# Patient Record
Sex: MALE | Race: WHITE | NOT HISPANIC OR LATINO | Employment: FULL TIME | URBAN - METROPOLITAN AREA
[De-identification: names, ages, dates, MRNs, and addresses within clinical notes are randomized per-mention and may not be internally consistent; named-entity substitution may affect disease eponyms.]

---

## 2018-04-20 ENCOUNTER — TRANSCRIBE ORDERS (OUTPATIENT)
Dept: NEPHROLOGY | Facility: CLINIC | Age: 63
End: 2018-04-20

## 2018-04-20 DIAGNOSIS — R94.4 ABNORMAL RESULTS OF KIDNEY FUNCTION STUDIES: ICD-10-CM

## 2018-04-20 DIAGNOSIS — N30.00 ACUTE CYSTITIS WITHOUT HEMATURIA: Primary | ICD-10-CM

## 2018-05-04 RX ORDER — CEPHALEXIN 500 MG/1
500 CAPSULE ORAL EVERY 6 HOURS
Refills: 0 | COMMUNITY
Start: 2018-04-17 | End: 2018-07-19 | Stop reason: ALTCHOICE

## 2018-05-07 LAB
ALBUMIN SNV-MCNC: 3.4 G/DL
BUN SERPL-MCNC: 26 MG/DL (ref 5–25)
BUN SERPL-MCNC: 45 MG/DL (ref 5–25)
CHLORIDE SERPL-SCNC: 108 MMOL/L (ref 100–108)
CREAT SERPL-MCNC: 1.7 MG/DL (ref 0.6–1.3)
EXT GLUCOSE BLD: 155
EXT GLUCOSE BLD: 159
EXTERNAL ALK PHOS: 65
EXTERNAL ALT: 22
EXTERNAL ANION GAP: 10
EXTERNAL ANION GAP: 10
EXTERNAL AST: 17
EXTERNAL BICARBONATE: 25
EXTERNAL CALCIUM: 8.5
EXTERNAL CALCIUM: 9
EXTERNAL CHLORIDE: 104
EXTERNAL CO2: 26
EXTERNAL CREATININE: 0.8
EXTERNAL EGFR: 41.4
EXTERNAL EGFR: >60
EXTERNAL POTASSIUM: 3.6
EXTERNAL POTASSIUM: 5
EXTERNAL SODIUM: 139
EXTERNAL SODIUM: 144
EXTERNAL T.BILIRUBIN: 0.3
EXTERNAL TOTAL PROTEIN: 6.3
HCT VFR BLD AUTO: 36.5 % (ref 36.5–49.3)
HGB BLD-MCNC: 12.3 G/DL (ref 12–17)
PLATELET # BLD AUTO: 268 THOUSANDS/UL (ref 149–390)
WBC # BLD AUTO: 9.46 THOUSAND/UL

## 2018-07-19 ENCOUNTER — OFFICE VISIT (OUTPATIENT)
Dept: NEPHROLOGY | Facility: CLINIC | Age: 63
End: 2018-07-19
Payer: COMMERCIAL

## 2018-07-19 VITALS
TEMPERATURE: 98.3 F | SYSTOLIC BLOOD PRESSURE: 140 MMHG | WEIGHT: 284 LBS | HEART RATE: 65 BPM | DIASTOLIC BLOOD PRESSURE: 80 MMHG

## 2018-07-19 DIAGNOSIS — R94.4 ABNORMAL RESULTS OF KIDNEY FUNCTION STUDIES: Primary | ICD-10-CM

## 2018-07-19 DIAGNOSIS — N30.00 ACUTE CYSTITIS WITHOUT HEMATURIA: ICD-10-CM

## 2018-07-19 PROCEDURE — 99243 OFF/OP CNSLTJ NEW/EST LOW 30: CPT | Performed by: INTERNAL MEDICINE

## 2018-07-19 RX ORDER — ASPIRIN 81 MG/1
81 TABLET ORAL
COMMUNITY
Start: 2017-07-11

## 2018-07-19 RX ORDER — LISINOPRIL 20 MG/1
20 TABLET ORAL
COMMUNITY
Start: 2018-07-17

## 2018-07-19 RX ORDER — BLOOD-GLUCOSE METER
EACH MISCELLANEOUS
COMMUNITY
Start: 2017-05-18

## 2018-07-19 RX ORDER — TRAMADOL HYDROCHLORIDE 50 MG/1
50 TABLET ORAL
COMMUNITY
Start: 2018-03-13

## 2018-07-19 RX ORDER — GLIMEPIRIDE 1 MG/1
1 TABLET ORAL
COMMUNITY
Start: 2018-04-09

## 2018-07-19 NOTE — PROGRESS NOTES
NEPHROLOGY OFFICE CONSULT  Chauncey Paredes 61 y o  male MRN: [id number]    Encounter: [id number] DATE: 7/19/2018    REASON FOR VISIT: Chauncey Paredes is a 61 y o male who was referred by Dr Brito for evaluation  Shukri Solano HPI:    This is a 77-year-old male with a past medical history of diabetes type 2, hypertension, right knee arthritis, recently found to have elevated creatinine and patient was referred to Nephrology for further evaluation  Upon review of old medical record on 2/8/2018 patient was found to have elevated creatinine of 1 7 with EGFR of 41  According to documentation patient had developed UTI in January 2018 and was treated with 7 day course of Bactrim  This blood work was done after finishing the course of Bactrim  Patient does have a repeat blood work done on 3/27/2018 showed creatinine of 0 8 with EGFR > 60  Patient does have right knee arthritis and said that he has been using over-the-counter Advil at least 2-4 tablet 2-3 times a week       Patient does have a history of diabetes type 2 which is currently under well controlled on oral medications including metformin  Patient does have a history of hypertension which is also well controlled with the use of lisinopril  REVIEW OF SYSTEMS:    Review of Systems   Constitutional: Negative for chills and fever  HENT: Negative for nosebleeds and sore throat  Eyes: Negative for photophobia and pain  Respiratory: Negative for choking and wheezing  Cardiovascular: Negative for palpitations and leg swelling  Gastrointestinal: Negative for abdominal pain and blood in stool  Endocrine: Negative for cold intolerance and heat intolerance  Genitourinary: Negative for flank pain and hematuria  Musculoskeletal: Negative for joint swelling and neck pain  Skin: Negative for color change and pallor  Allergic/Immunologic: Negative for environmental allergies and immunocompromised state  Neurological: Negative for seizures and syncope  Hematological: Negative for adenopathy  Does not bruise/bleed easily  Psychiatric/Behavioral: Negative for confusion and suicidal ideas  PAST MEDICAL HISTORY:  Past Medical History:   Diagnosis Date    Diabetes mellitus ([address] )     Hypertension        PAST SURGICAL HISTORY:  Past Surgical History:   Procedure Laterality Date    FEMUR FRACTURE SURGERY      ROTATOR CUFF REPAIR      SHOULDER SURGERY Right        SOCIAL HISTORY:  History   Alcohol Use    Yes     History   Drug use: Unknown     History   Smoking Status    Former Smoker   Smokeless Tobacco    Never Used       FAMILY HISTORY:  Family History   Problem Relation Age of Onset    Diabetes Brother        ALLERGY:  No Known Allergies    MEDICATIONS:    Current Outpatient Prescriptions:     aspirin (ECOTRIN LOW STRENGTH) 81 mg EC tablet, Take 81 mg by mouth, Disp: , Rfl:     Blood Glucose Monitoring Suppl (ONE TOUCH ULTRA 2) w/Device KIT, Use as directed , Disp: , Rfl:     glimepiride (AMARYL) 1 mg tablet, Take 1 mg by mouth, Disp: , Rfl:     glucose blood (ONE TOUCH ULTRA TEST) test strip, Use as directed , Disp: , Rfl:     lisinopril (ZESTRIL) 20 mg tablet, Take 20 mg by mouth, Disp: , Rfl:     metFORMIN (GLUCOPHAGE) 1000 MG tablet, Take 1/2 TABLET in the morning and 1 tab with dinner, Disp: , Rfl:     ONETOUCH DELICA LANCETS FINE MISC, Use as directed , Disp: , Rfl:     traMADol (ULTRAM) 50 mg tablet, Take 50 mg by mouth, Disp: , Rfl:     PHYSICAL EXAM:  Vitals:    07/19/18 1542   BP: 140/80   BP Location: Left arm   Patient Position: Sitting   Cuff Size: Large   Pulse: 65   Temp: 98 3 °F (36 8 °C)   TempSrc: Oral   Weight: 129 kg (284 lb)     There is no height or weight on file to calculate BMI  Physical Exam   Constitutional: He appears well-nourished  No distress  HENT:   Head: Normocephalic and atraumatic  Eyes: Conjunctivae are normal  No scleral icterus  Right eye exhibits normal extraocular motion   Left eye exhibits normal extraocular motion  Neck: Neck supple  No JVD present  Cardiovascular: Normal rate, S1 normal and S2 normal     Pulmonary/Chest: No accessory muscle usage  No respiratory distress  He has no wheezes  Abdominal: Soft  He exhibits no distension  Musculoskeletal:        Right ankle: He exhibits swelling  Left ankle: He exhibits swelling  Right hand: He exhibits no laceration  Left hand: He exhibits no laceration  Bilateral trace pedal edema   Lymphadenopathy:        Right cervical: No superficial cervical adenopathy present  Left cervical: No superficial cervical adenopathy present  Right: No supraclavicular adenopathy present  Left: No supraclavicular adenopathy present  Neurological: He is alert  He is not disoriented  Skin: Skin is warm  No laceration noted  No cyanosis  Psychiatric: He is not combative  He does not exhibit a depressed mood  He expresses no suicidal ideation  LAB RESULTS:  Results for orders placed or performed in visit on 56/35/33   Basic metabolic panel   Result Value Ref Range    EXTERNAL SODIUM 144     EXTERNAL POTASSIUM 5 0     Chloride 108 100 - 108 mmol/L    EXTERNAL CO2 26     BUN 26 (A) 5 - 25 mg/dL    EXTERNAL CREATININE 0 8     EXT Glucose Bld 155     EXTERNAL CALCIUM 9 0     EXTERNAL ANION GAP 10     EXTERNAL EGFR >60 0        ASSESSMENT and PLAN:  Melinda Peraza was seen today for consult  Diagnoses and all orders for this visit:    Abnormal results of kidney function studies  -     Ambulatory referral to Nephrology  -     Basic metabolic panel; Future  -     Microalbumin / creatinine urine ratio; Future  -     Urinalysis with reflex to microscopic; Future  -     Basic metabolic panel; Future  -     Microalbumin / creatinine urine ratio; Future  -     Urinalysis with reflex to microscopic; Future    Acute cystitis without hematuria  -     Ambulatory referral to Nephrology      1  Abnormal blood work    Patient had BMP done on 2/8/2018 showed creatinine 1 7 with EGFR of 41 and patient was initially referred to Nephrology in February 2018 but has not come for follow-up until now  Patient has repeat blood work done on 3/27/2018 showed creatinine 0 8 with EGFR > 60  Looking at patient's PCP note from February you 2018, patient was treated with Bactrim for UTI for 7 days and this blood work was done post antibiotic treatment  Suspect elevated creatinine in February 2018 due to Bactrim effect  Patient does take NSAIDs on regular basis for long pre of time for right knee arthritis and may have developed abnormal renal function due to use of NSAID in the light of metformin and lisinopril  As mentioned earlier patient's last known creatinine from 3/27/2018 is 0 8 with EGFR > 60  Plan to check BMP, urine analysis along with urine microalbumin to creatinine ratio for further evaluation today  Since NSAID in theory can decrease the renal function I have advised patient to avoid long-term use of NSAID  Will definitely consider stopping NSAID if GFR is below 60  Returns to Nephrology Clinic in 3 months  Plan to check BMP along with urine analysis and urine microalbumin to creatinine ratio before next visit  Patient can be reached at [phone]    Lab (reviewed on 7/23/2018)-called and discussed results with patient today  Creatinine 0 81 with EGFR 95  Urine analysis showed no dysuria or hematuria  Urine microalbumin to creatinine ratio of 39 mg   Normal sodium, potassium, bicarb and calcium    NO CHANGE    Portions of the record may have been created with voice recognition software  Occasional wrong word or "sound a like" substitutions may have occurred due to the inherent limitations of voice recognition software  Read the chart carefully and recognize, using context, where substitutions have occurred  If you have any questions, please contact the dictating provider

## 2018-07-19 NOTE — LETTER
July 19, 2018     Franklin Ramirez MD  [address] [facility] [address]    Patient: Nereyda Meza   YOB: 1955   Date of Visit: 7/19/2018       Dear Dr Dameon Montanez:    Thank you for referring Nereyda Meza to me for evaluation  Below are my notes for this consultation  If you have questions, please do not hesitate to call me  I look forward to following your patient along with you  Sincerely,        Michelle Tello MD        CC: No Recipients  Michelle Tello MD  7/19/2018  4:20 PM  Sign at close encounter  [address] 61 y o  male MRN: [id number]    Encounter: [id number] DATE: 7/19/2018    REASON FOR VISIT: Nereyda Meza is a 61 y o male who was referred by Dr Brito for evaluation  Ashley Pierce HPI:    This is a 77-year-old male with a past medical history of diabetes type 2, hypertension, right knee arthritis, recently found to have elevated creatinine and patient was referred to Nephrology for further evaluation  Upon review of old medical record on 2/8/2018 patient was found to have elevated creatinine of 1 7 with EGFR of 41  According to documentation patient had developed UTI in January 2018 and was treated with 7 day course of Bactrim  This blood work was done after finishing the course of Bactrim  Patient does have a repeat blood work done on 3/27/2018 showed creatinine of 0 8 with EGFR > 60  Patient does have right knee arthritis and said that he has been using over-the-counter Advil at least 2-4 tablet 2-3 times a week       Patient does have a history of diabetes type 2 which is currently under well controlled on oral medications including metformin  Patient does have a history of hypertension which is also well controlled with the use of lisinopril  REVIEW OF SYSTEMS:    Review of Systems   Constitutional: Negative for chills and fever  HENT: Negative for nosebleeds and sore throat  Eyes: Negative for photophobia and pain  Respiratory: Negative for choking and wheezing  Cardiovascular: Negative for palpitations and leg swelling  Gastrointestinal: Negative for abdominal pain and blood in stool  Endocrine: Negative for cold intolerance and heat intolerance  Genitourinary: Negative for flank pain and hematuria  Musculoskeletal: Negative for joint swelling and neck pain  Skin: Negative for color change and pallor  Allergic/Immunologic: Negative for environmental allergies and immunocompromised state  Neurological: Negative for seizures and syncope  Hematological: Negative for adenopathy  Does not bruise/bleed easily  Psychiatric/Behavioral: Negative for confusion and suicidal ideas           PAST MEDICAL HISTORY:  Past Medical History:   Diagnosis Date    Diabetes mellitus ([facility] [address] )     Hypertension        PAST SURGICAL HISTORY:  Past Surgical History:   Procedure Laterality Date    FEMUR FRACTURE SURGERY      ROTATOR CUFF REPAIR      SHOULDER SURGERY Right        SOCIAL HISTORY:  History   Alcohol Use    Yes     History   Drug use: Unknown     History   Smoking Status    Former Smoker   Smokeless Tobacco    Never Used       FAMILY HISTORY:  Family History   Problem Relation Age of Onset    Diabetes Brother        ALLERGY:  No Known Allergies    MEDICATIONS:    Current Outpatient Prescriptions:     aspirin (ECOTRIN LOW STRENGTH) 81 mg EC tablet, Take 81 mg by mouth, Disp: , Rfl:     Blood Glucose Monitoring Suppl (ONE TOUCH ULTRA 2) w/Device KIT, Use as directed , Disp: , Rfl:     glimepiride (AMARYL) 1 mg tablet, Take 1 mg by mouth, Disp: , Rfl:     glucose blood (ONE TOUCH ULTRA TEST) test strip, Use as directed , Disp: , Rfl:     lisinopril (ZESTRIL) 20 mg tablet, Take 20 mg by mouth, Disp: , Rfl:     metFORMIN (GLUCOPHAGE) 1000 MG tablet, Take 1/2 TABLET in the morning and 1 tab with dinner, Disp: , Rfl:     ONETOUCH DELICA LANCETS FINE MISC, Use as directed , Disp: , Rfl:     traMADol (ULTRAM) 50 mg tablet, Take 50 mg by mouth, Disp: , Rfl:     PHYSICAL EXAM:  Vitals:    07/19/18 1542   BP: 140/80   BP Location: Left arm   Patient Position: Sitting   Cuff Size: Large   Pulse: 65   Temp: 98 3 °F (36 8 °C)   TempSrc: Oral   Weight: 129 kg (284 lb)     There is no height or weight on file to calculate BMI  Physical Exam   Constitutional: He appears well-nourished  No distress  HENT:   Head: Normocephalic and atraumatic  Eyes: Conjunctivae are normal  No scleral icterus  Right eye exhibits normal extraocular motion  Left eye exhibits normal extraocular motion  Neck: Neck supple  No JVD present  Cardiovascular: Normal rate, S1 normal and S2 normal     Pulmonary/Chest: No accessory muscle usage  No respiratory distress  He has no wheezes  Abdominal: Soft  He exhibits no distension  Musculoskeletal:        Right ankle: He exhibits swelling  Left ankle: He exhibits swelling  Right hand: He exhibits no laceration  Left hand: He exhibits no laceration  Bilateral trace pedal edema   Lymphadenopathy:        Right cervical: No superficial cervical adenopathy present  Left cervical: No superficial cervical adenopathy present  Right: No supraclavicular adenopathy present  Left: No supraclavicular adenopathy present  Neurological: He is alert  He is not disoriented  Skin: Skin is warm  No laceration noted  No cyanosis  Psychiatric: He is not combative  He does not exhibit a depressed mood  He expresses no suicidal ideation         LAB RESULTS:  Results for orders placed or performed in visit on 50/61/43   Basic metabolic panel   Result Value Ref Range    EXTERNAL SODIUM 144     EXTERNAL POTASSIUM 5 0     Chloride 108 100 - 108 mmol/L    EXTERNAL CO2 26     BUN 26 (A) 5 - 25 mg/dL    EXTERNAL CREATININE 0 8     EXT Glucose Bld 155     EXTERNAL CALCIUM 9 0     EXTERNAL ANION GAP 10     EXTERNAL EGFR >60 0        ASSESSMENT and PLAN:  Linette Lott was seen today for consult  Diagnoses and all orders for this visit:    Abnormal results of kidney function studies  -     Ambulatory referral to Nephrology  -     Basic metabolic panel; Future  -     Microalbumin / creatinine urine ratio; Future  -     Urinalysis with reflex to microscopic; Future  -     Basic metabolic panel; Future  -     Microalbumin / creatinine urine ratio; Future  -     Urinalysis with reflex to microscopic; Future    Acute cystitis without hematuria  -     Ambulatory referral to Nephrology      1  Abnormal blood work  Patient had BMP done on 2/8/2018 showed creatinine 1 7 with EGFR of 41 and patient was initially referred to Nephrology in February 2018 but has not come for follow-up until now  Patient has repeat blood work done on 3/27/2018 showed creatinine 0 8 with EGFR > 60  Looking at patient's PCP note from February you 2018, patient was treated with Bactrim for UTI for 7 days and this blood work was done post antibiotic treatment  Suspect elevated creatinine in February 2018 due to Bactrim effect  Patient does take NSAIDs on regular basis for long pre of time for right knee arthritis and may have developed abnormal renal function due to use of NSAID in the light of metformin and lisinopril  As mentioned earlier patient's last known creatinine from 3/27/2018 is 0 8 with EGFR > 60  Plan to check BMP, urine analysis along with urine microalbumin to creatinine ratio for further evaluation today  Since NSAID in theory can decrease the renal function I have advised patient to avoid long-term use of NSAID  Will definitely consider stopping NSAID if GFR is below 60  Returns to Nephrology Clinic in 3 months  Plan to check BMP along with urine analysis and urine microalbumin to creatinine ratio before next visit  Patient can be reached at [phone]    Portions of the record may have been created with voice recognition software   Occasional wrong word or "sound a like" substitutions may have occurred due to the inherent limitations of voice recognition software  Read the chart carefully and recognize, using context, where substitutions have occurred  If you have any questions, please contact the dictating provider

## 2018-07-21 ENCOUNTER — APPOINTMENT (OUTPATIENT)
Dept: LAB | Facility: HOSPITAL | Age: 63
End: 2018-07-21
Attending: INTERNAL MEDICINE
Payer: COMMERCIAL

## 2018-07-21 DIAGNOSIS — R94.4 ABNORMAL RESULTS OF KIDNEY FUNCTION STUDIES: ICD-10-CM

## 2018-07-21 LAB
ANION GAP SERPL CALCULATED.3IONS-SCNC: 8 MMOL/L (ref 4–13)
BILIRUB UR QL STRIP: NEGATIVE
BUN SERPL-MCNC: 17 MG/DL (ref 5–25)
CALCIUM SERPL-MCNC: 8.9 MG/DL (ref 8.3–10.1)
CHLORIDE SERPL-SCNC: 110 MMOL/L (ref 100–108)
CLARITY UR: CLEAR
CO2 SERPL-SCNC: 26 MMOL/L (ref 21–32)
COLOR UR: YELLOW
CREAT SERPL-MCNC: 0.81 MG/DL (ref 0.6–1.3)
CREAT UR-MCNC: 66.7 MG/DL
GFR SERPL CREATININE-BSD FRML MDRD: 95 ML/MIN/1.73SQ M
GLUCOSE P FAST SERPL-MCNC: 112 MG/DL (ref 65–99)
GLUCOSE UR STRIP-MCNC: NEGATIVE MG/DL
HGB UR QL STRIP.AUTO: NEGATIVE
KETONES UR STRIP-MCNC: NEGATIVE MG/DL
LEUKOCYTE ESTERASE UR QL STRIP: NEGATIVE
MICROALBUMIN UR-MCNC: 26.2 MG/L (ref 0–20)
MICROALBUMIN/CREAT 24H UR: 39 MG/G CREATININE (ref 0–30)
NITRITE UR QL STRIP: NEGATIVE
PH UR STRIP.AUTO: 5.5 [PH] (ref 4.5–8)
POTASSIUM SERPL-SCNC: 3.7 MMOL/L (ref 3.5–5.3)
PROT UR STRIP-MCNC: NEGATIVE MG/DL
SODIUM SERPL-SCNC: 144 MMOL/L (ref 136–145)
SP GR UR STRIP.AUTO: 1.02 (ref 1–1.03)
UROBILINOGEN UR QL STRIP.AUTO: 0.2 E.U./DL

## 2018-07-21 PROCEDURE — 81003 URINALYSIS AUTO W/O SCOPE: CPT

## 2018-07-21 PROCEDURE — 36415 COLL VENOUS BLD VENIPUNCTURE: CPT

## 2018-07-21 PROCEDURE — 82570 ASSAY OF URINE CREATININE: CPT

## 2018-07-21 PROCEDURE — 80048 BASIC METABOLIC PNL TOTAL CA: CPT

## 2018-07-21 PROCEDURE — 82043 UR ALBUMIN QUANTITATIVE: CPT
